# Patient Record
Sex: MALE | Race: WHITE | NOT HISPANIC OR LATINO | Employment: FULL TIME | URBAN - NONMETROPOLITAN AREA
[De-identification: names, ages, dates, MRNs, and addresses within clinical notes are randomized per-mention and may not be internally consistent; named-entity substitution may affect disease eponyms.]

---

## 2023-10-04 ENCOUNTER — OFFICE VISIT (OUTPATIENT)
Dept: PRIMARY CARE | Facility: CLINIC | Age: 28
End: 2023-10-04
Payer: COMMERCIAL

## 2023-10-04 VITALS
BODY MASS INDEX: 25.04 KG/M2 | OXYGEN SATURATION: 99 % | DIASTOLIC BLOOD PRESSURE: 70 MMHG | SYSTOLIC BLOOD PRESSURE: 116 MMHG | WEIGHT: 184.9 LBS | HEIGHT: 72 IN | HEART RATE: 63 BPM

## 2023-10-04 DIAGNOSIS — L01.00 IMPETIGO: Primary | ICD-10-CM

## 2023-10-04 PROBLEM — J01.00 ACUTE NON-RECURRENT MAXILLARY SINUSITIS: Status: ACTIVE | Noted: 2023-10-04

## 2023-10-04 PROBLEM — R10.13 EPIGASTRIC PAIN: Status: ACTIVE | Noted: 2023-10-04

## 2023-10-04 PROBLEM — L73.9 FOLLICULITIS: Status: ACTIVE | Noted: 2023-10-04

## 2023-10-04 PROBLEM — K29.00 ACUTE GASTRITIS WITHOUT HEMORRHAGE: Status: ACTIVE | Noted: 2023-10-04

## 2023-10-04 PROBLEM — T30.0 SKIN BURN: Status: RESOLVED | Noted: 2023-10-04 | Resolved: 2023-10-04

## 2023-10-04 PROBLEM — J01.00 ACUTE NON-RECURRENT MAXILLARY SINUSITIS: Status: RESOLVED | Noted: 2023-10-04 | Resolved: 2023-10-04

## 2023-10-04 PROBLEM — K29.00 ACUTE GASTRITIS WITHOUT HEMORRHAGE: Status: RESOLVED | Noted: 2023-10-04 | Resolved: 2023-10-04

## 2023-10-04 PROBLEM — J30.2 SEASONAL ALLERGIES: Status: ACTIVE | Noted: 2023-10-04

## 2023-10-04 PROBLEM — T30.0 SKIN BURN: Status: ACTIVE | Noted: 2023-10-04

## 2023-10-04 PROCEDURE — 99213 OFFICE O/P EST LOW 20 MIN: CPT | Performed by: FAMILY MEDICINE

## 2023-10-04 PROCEDURE — 1036F TOBACCO NON-USER: CPT | Performed by: FAMILY MEDICINE

## 2023-10-04 RX ORDER — SULFAMETHOXAZOLE AND TRIMETHOPRIM 800; 160 MG/1; MG/1
1 TABLET ORAL 2 TIMES DAILY
Qty: 20 TABLET | Refills: 0 | Status: SHIPPED | OUTPATIENT
Start: 2023-10-04 | End: 2023-10-14

## 2023-10-04 RX ORDER — CEPHALEXIN 500 MG/1
1 CAPSULE ORAL EVERY 12 HOURS
COMMUNITY
Start: 2021-08-26 | End: 2023-10-04 | Stop reason: ALTCHOICE

## 2023-10-04 RX ORDER — MUPIROCIN 20 MG/G
OINTMENT TOPICAL 3 TIMES DAILY
Qty: 22 G | Refills: 0 | Status: SHIPPED | OUTPATIENT
Start: 2023-10-04 | End: 2023-10-14

## 2023-10-04 RX ORDER — CETIRIZINE HYDROCHLORIDE 10 MG/1
1 TABLET ORAL DAILY
COMMUNITY
Start: 2021-04-26

## 2023-10-04 ASSESSMENT — ENCOUNTER SYMPTOMS
FEVER: 0
FATIGUE: 0
CHILLS: 0

## 2023-10-04 NOTE — PROGRESS NOTES
Subjective   Patient ID: Amadou Luis is a 28 y.o. male who presents for Follow-up (impetigo).    HPI   Was at urgent care 9/2 for impetigo, Rx Keflex  Antibiotics help for a short time, not cleared, crusting, some painful, no prior    Review of Systems   Constitutional:  Negative for chills, fatigue and fever.   Skin:  Positive for rash.       Objective   /70   Pulse 63   Ht 1.829 m (6')   Wt 83.9 kg (184 lb 14.4 oz)   SpO2 99%   BMI 25.08 kg/m²     Physical Exam  Vitals and nursing note reviewed.   Constitutional:       Appearance: Normal appearance. He is normal weight.   Skin:     Comments: Mild erythema along the right jaw below the mouth, some honey looking crust noted.  Mildly tender, minimal swelling.   Neurological:      Mental Status: He is alert.         Assessment/Plan   Problem List Items Addressed This Visit    None  Visit Diagnoses         Codes    Impetigo    -  Primary L01.00    Failed Keflex, prescribed Bactrim DS 1 p.o. twice daily x10 days along with Bactroban ointment, advised about antibacterial wash follow-up as needed     Relevant Medications    sulfamethoxazole-trimethoprim (Bactrim DS) 800-160 mg tablet    mupirocin (Bactroban) 2 % ointment

## 2023-10-26 ENCOUNTER — APPOINTMENT (OUTPATIENT)
Dept: PRIMARY CARE | Facility: CLINIC | Age: 28
End: 2023-10-26
Payer: COMMERCIAL

## 2023-10-30 ENCOUNTER — OFFICE VISIT (OUTPATIENT)
Dept: PRIMARY CARE | Facility: CLINIC | Age: 28
End: 2023-10-30
Payer: COMMERCIAL

## 2023-10-30 VITALS
HEIGHT: 72 IN | OXYGEN SATURATION: 97 % | SYSTOLIC BLOOD PRESSURE: 120 MMHG | HEART RATE: 59 BPM | BODY MASS INDEX: 25.42 KG/M2 | WEIGHT: 187.7 LBS | DIASTOLIC BLOOD PRESSURE: 70 MMHG

## 2023-10-30 DIAGNOSIS — L73.9 FOLLICULITIS: Primary | ICD-10-CM

## 2023-10-30 PROCEDURE — 99213 OFFICE O/P EST LOW 20 MIN: CPT | Performed by: NURSE PRACTITIONER

## 2023-10-30 PROCEDURE — 1036F TOBACCO NON-USER: CPT | Performed by: NURSE PRACTITIONER

## 2023-10-30 RX ORDER — MUPIROCIN 20 MG/G
OINTMENT TOPICAL 3 TIMES DAILY
Qty: 22 G | Refills: 0 | Status: SHIPPED | OUTPATIENT
Start: 2023-10-30 | End: 2023-11-09

## 2023-10-30 RX ORDER — SULFAMETHOXAZOLE AND TRIMETHOPRIM 800; 160 MG/1; MG/1
1 TABLET ORAL 2 TIMES DAILY
Qty: 20 TABLET | Refills: 0 | Status: SHIPPED | OUTPATIENT
Start: 2023-10-30 | End: 2023-11-09

## 2023-10-30 ASSESSMENT — ENCOUNTER SYMPTOMS
FEVER: 0
SORE THROAT: 0
COUGH: 0

## 2023-10-30 ASSESSMENT — PATIENT HEALTH QUESTIONNAIRE - PHQ9
SUM OF ALL RESPONSES TO PHQ9 QUESTIONS 1 AND 2: 0
1. LITTLE INTEREST OR PLEASURE IN DOING THINGS: NOT AT ALL
2. FEELING DOWN, DEPRESSED OR HOPELESS: NOT AT ALL

## 2023-10-30 NOTE — PROGRESS NOTES
Subjective   Patient ID: Amadou Luis is a 28 y.o. male who presents for Rash (It has improved since last visit but not gone ).    Amadou comes to the office for continued rash.  Previously was treated with a course Bactrim & bactroban ointment.  Rash has improved considerably and he feels like it is moving around now.  Has a few scattered pustules both arms and right upper thigh.  He denies fever chilling joint pains.  Lesions are described as not being itchy but some tenderness with deep touching.      Rash  This is a chronic problem. The problem has been gradually improving since onset. The affected locations include the left arm, right arm and right upper leg. The rash is characterized by redness and pain. He was exposed to nothing. Pertinent negatives include no cough, fever, joint pain or sore throat. Past treatments include antibiotics and antibiotic cream. The treatment provided moderate relief.        Review of Systems   Constitutional:  Negative for fever.   HENT:  Negative for sore throat.    Respiratory:  Negative for cough.    Musculoskeletal:  Negative for joint pain.   Skin:  Positive for rash.       Objective   /70   Pulse 59   Ht 1.829 m (6')   Wt 85.1 kg (187 lb 11.2 oz)   SpO2 97%   BMI 25.46 kg/m²     Physical Exam  Vitals reviewed.   Constitutional:       Appearance: Normal appearance.   Skin:     Findings: Rash present. Rash is pustular.      Comments: Few scattered pustules noted to left forearm, 1 on the right forearm and a few scattered to the right top of thigh   Neurological:      Mental Status: He is alert.         Assessment/Plan   Problem List Items Addressed This Visit             ICD-10-CM    Folliculitis - Primary L73.9    Relevant Medications    sulfamethoxazole-trimethoprim (Bactrim DS) 800-160 mg tablet    mupirocin (Bactroban) 2 % ointment

## 2023-10-30 NOTE — PATIENT INSTRUCTIONS
Bactrim DS 1 tablet twice a day x10 days please make sure you are drinking plenty of water when you are on the antibiotic  Bactroban ointment 3 times a day for 10 days  Make sure you throw your razor away and when not in use make sure it is stored in the upright position

## 2023-11-13 ENCOUNTER — TELEPHONE (OUTPATIENT)
Dept: PRIMARY CARE | Facility: CLINIC | Age: 28
End: 2023-11-13

## 2023-11-13 ENCOUNTER — OFFICE VISIT (OUTPATIENT)
Dept: PRIMARY CARE | Facility: CLINIC | Age: 28
End: 2023-11-13
Payer: COMMERCIAL

## 2023-11-13 VITALS
SYSTOLIC BLOOD PRESSURE: 118 MMHG | HEIGHT: 72 IN | OXYGEN SATURATION: 99 % | HEART RATE: 65 BPM | DIASTOLIC BLOOD PRESSURE: 80 MMHG | BODY MASS INDEX: 25.33 KG/M2 | WEIGHT: 187 LBS

## 2023-11-13 DIAGNOSIS — L73.9 FOLLICULITIS: Primary | ICD-10-CM

## 2023-11-13 DIAGNOSIS — L08.9 INFECTED CUT OF FINGER: ICD-10-CM

## 2023-11-13 DIAGNOSIS — S61.219A INFECTED CUT OF FINGER: ICD-10-CM

## 2023-11-13 PROCEDURE — 1036F TOBACCO NON-USER: CPT | Performed by: NURSE PRACTITIONER

## 2023-11-13 PROCEDURE — 99213 OFFICE O/P EST LOW 20 MIN: CPT | Performed by: NURSE PRACTITIONER

## 2023-11-13 RX ORDER — CEPHALEXIN 500 MG/1
500 CAPSULE ORAL 2 TIMES DAILY
Qty: 20 CAPSULE | Refills: 0 | Status: SHIPPED | OUTPATIENT
Start: 2023-11-13 | End: 2023-11-23

## 2023-11-13 ASSESSMENT — ENCOUNTER SYMPTOMS
FATIGUE: 0
CHILLS: 0
FEVER: 0
WOUND: 0
COLOR CHANGE: 0

## 2023-11-13 NOTE — PATIENT INSTRUCTIONS
Keflex 500 mg twice a day x10 days.  Please cover your finger with bacitracin and a Band-Aid while you are at work.  We will refer you to [facility] for continued rash to bilateral upper extremities

## 2023-11-13 NOTE — PROGRESS NOTES
Subjective   Patient ID: Amadou Luis is a 28 y.o. male who presents for Skin Irritation  (X 1 mo, not going away ).    Amadou comes to the office for continued rash.  Has previously been treated with 2 courses of bactrim in which the rash does temporarily improve but never completely goes away. Rash to sherrie upper extremities.  No constitutional symptoms. Received a cut to the top of the L 3rd finger @ knuckle  & broke open skin w/ plybar 2WKS ago. + drainage brown color from under skin. Using bactroban oint & covering when @ work. Last Tdap 2020.   Referral placed to dermatology           Review of Systems   Constitutional:  Negative for chills, fatigue and fever.   Skin:  Positive for rash. Negative for color change, pallor and wound.       Objective   /80   Pulse 65   Ht 1.829 m (6')   Wt 84.8 kg (187 lb)   SpO2 99%   BMI 25.36 kg/m²     Physical Exam  Skin:     Comments: Left third finger at MCP joint with a small linear dried scab with generalized swelling and tenderness to the side of the knuckle. + Circumferential redness.  Few scattered papules to bilateral arms         Assessment/Plan   Problem List Items Addressed This Visit             ICD-10-CM    Folliculitis - Primary L73.9    Relevant Orders    Referral to Dermatology    Infected cut of finger S61.219A, L08.9    Relevant Medications    cephalexin (Keflex) 500 mg capsule